# Patient Record
Sex: MALE | Race: WHITE | URBAN - METROPOLITAN AREA
[De-identification: names, ages, dates, MRNs, and addresses within clinical notes are randomized per-mention and may not be internally consistent; named-entity substitution may affect disease eponyms.]

---

## 2018-03-14 ENCOUNTER — OFFICE VISIT (OUTPATIENT)
Dept: FAMILY MEDICINE CLINIC | Age: 58
End: 2018-03-14

## 2018-03-14 VITALS
RESPIRATION RATE: 16 BRPM | HEIGHT: 62 IN | BODY MASS INDEX: 29.26 KG/M2 | DIASTOLIC BLOOD PRESSURE: 92 MMHG | HEART RATE: 66 BPM | OXYGEN SATURATION: 96 % | WEIGHT: 159 LBS | SYSTOLIC BLOOD PRESSURE: 152 MMHG | TEMPERATURE: 98 F

## 2018-03-14 DIAGNOSIS — M54.2 NECK PAIN: Primary | ICD-10-CM

## 2018-03-14 RX ORDER — CEPHALEXIN 500 MG/1
500 CAPSULE ORAL 2 TIMES DAILY
Qty: 20 CAP | Refills: 0 | Status: SHIPPED | OUTPATIENT
Start: 2018-03-14 | End: 2018-03-24

## 2018-03-14 NOTE — PROGRESS NOTES
HISTORY OF PRESENT ILLNESS  Thania Corbin is a 63 y.o. male. Cyst   The history is provided by the patient. This is a new problem. Episode onset: C/o cyst on the back of her neck. It has been present for about a year and was growing. It ruptured spontaneously and drained white fluid. Pain decreased after it ruptured. Episode frequency: Reports it has been draining for several weeks. No fever/chills. Review of Systems   Constitutional: Negative. Physical Exam   Constitutional: He appears well-developed and well-nourished. Skin:   Posterior neck:  Small area of edema about 1x2 cm. No d/c. There is a scab on the lower portion of the lesions. No TTP       ASSESSMENT and PLAN  Lesion on neck: Etiology unclear. May be cyst which was secondarily infected. Will treat with keflex BID for 10 days and monitor. If lesion persists, may need surgical evaluation.

## 2018-04-11 ENCOUNTER — OFFICE VISIT (OUTPATIENT)
Dept: FAMILY MEDICINE CLINIC | Age: 58
End: 2018-04-11

## 2018-04-11 VITALS
TEMPERATURE: 97.7 F | BODY MASS INDEX: 29.63 KG/M2 | WEIGHT: 161 LBS | HEART RATE: 51 BPM | RESPIRATION RATE: 16 BRPM | HEIGHT: 62 IN | OXYGEN SATURATION: 95 % | SYSTOLIC BLOOD PRESSURE: 128 MMHG | DIASTOLIC BLOOD PRESSURE: 79 MMHG

## 2018-04-11 DIAGNOSIS — L72.0 CYST OF SKIN AND SUBCUTANEOUS TISSUE: Primary | ICD-10-CM

## 2018-04-11 NOTE — PROGRESS NOTES
HISTORY OF PRESENT ILLNESS  Iona Quevedo is a 63 y.o. male. Follow-up   The history is provided by the patient. This is a chronic problem. Episode onset: Presents for f/u of cyst on his neck. The cyst grew in size and burst prior to his first visit with us. He was treated with keflex for suspected infx. Since that time, the drainage stopped and area closed. Episode frequency: However, he continues to have significant pain at the site of his lesion. Review of Systems   Constitutional: Negative. Physical Exam   Constitutional: He appears well-developed and well-nourished. HENT:   Head: Normocephalic and atraumatic. Skin:   Posterior neck:  Previous open lesion has closed with minimal surrounding erythema. There is still a small palpable subcutaneous mass with tenderness to palpation. Unclear if this represents scar or early reforming of cyst.       ASSESSMENT and PLAN  Cyst of posterior neck: Would refer to surgery or derm for removal if indicated.

## 2018-04-18 ENCOUNTER — OFFICE VISIT (OUTPATIENT)
Dept: SURGERY | Age: 58
End: 2018-04-18

## 2018-04-18 VITALS
RESPIRATION RATE: 18 BRPM | WEIGHT: 159 LBS | HEART RATE: 56 BPM | SYSTOLIC BLOOD PRESSURE: 122 MMHG | BODY MASS INDEX: 29.08 KG/M2 | DIASTOLIC BLOOD PRESSURE: 78 MMHG | TEMPERATURE: 97.9 F

## 2018-04-18 DIAGNOSIS — L72.3 SEBACEOUS CYST: Primary | ICD-10-CM

## 2018-04-18 DIAGNOSIS — L72.3 INFLAMED SEBACEOUS CYST: ICD-10-CM

## 2018-04-18 DIAGNOSIS — R22.1 NECK MASS: ICD-10-CM

## 2018-04-18 RX ORDER — SODIUM CHLORIDE 0.9 % (FLUSH) 0.9 %
5-10 SYRINGE (ML) INJECTION EVERY 8 HOURS
Status: CANCELLED | OUTPATIENT
Start: 2018-04-18

## 2018-04-18 RX ORDER — SODIUM CHLORIDE 0.9 % (FLUSH) 0.9 %
5-10 SYRINGE (ML) INJECTION AS NEEDED
Status: CANCELLED | OUTPATIENT
Start: 2018-04-18

## 2018-04-18 NOTE — PROGRESS NOTES
[facility]  General Surgery    Subjective:      HPI: The patient is a very pleasant 60-year-old male with a past medical history remarkable for frozen right shoulder and surgery for bladder stones and history of hearing loss since childhood. He is referred by the [place] [id number] team for evaluation and management of a cyst on the posterior surface of the neck. Patient states that he first noted this is approximately 4 years ago. It felt like a knot. This started to become larger approximately 1 month ago. It did not burst.  He covered it with a Band-Aid. He saw the physicians from the [facility] Danielle Flaherty who started him on Keflex. The mass then became ruptured and burst again. It is now flat with the skin. He denies any fever or chills. He has never anything like this before. There are no active problems to display for this patient. No past medical history on file. Past Surgical History:   Procedure Laterality Date    HX ORTHOPAEDIC      frozen shoulder right shoulder     HX OTHER SURGICAL      bladder x5       Family History   Problem Relation Age of Onset    Stroke Mother     Heart Attack Father       Social History   Substance Use Topics    Smoking status: Current Every Day Smoker     Packs/day: 0.25    Smokeless tobacco: Never Used    Alcohol use No      No Known Allergies    Prior to Admission medications    Not on File       Review of Systems:    14 systems were reviewed. The results are as above in the HPI and otherwise negative. Objective:     Vitals:    04/18/18 0843   BP: 122/78   Pulse: (!) 56   Resp: 18   Temp: 97.9 °F (36.6 °C)   Weight: 72.1 kg (159 lb)       Physical Exam:  GENERAL: alert, cooperative, no distress, appears stated age, hearing aids in place  EYE: conjunctivae/corneas clear. PERRL, EOM's intact.   THROAT & NECK: normal and no erythema or exudates noted. ,    LYMPHATIC: Cervical, supraclavicular, and axillary nodes normal. ,   LUNG: clear to auscultation bilaterally,   HEART: regular rate and rhythm, S1, S2 normal, no murmur, click, rub or gallop,   ABDOMEN: soft, non-tender. Bowel sounds normal. No masses,  no organomegaly,   EXTREMITIES:  extremities normal, atraumatic, no cyanosis or edema,   SKIN: 1 x 2 cm cyst on the right side of the posterior surface of the neck 2 cm below the hairline. NEUROLOGIC: AOx3. Cranial nerves 2-12 and sensation grossly intact. ,     Data Review:  to be done    Mr. Sruthi Bustos has a reminder for a \"due or due soon\" health maintenance. I have asked that he contact his primary care provider for follow-up on this health maintenance. Impression:     · Patient with an inflamed sebaceous cyst of the posterior surface of the neck.     Plan:     · Excisional biopsy of soft tissue mass of the posterior surface of the neck  · Consent on chart  · Preoperative orders written    Signed By: Mesha Gerber MD     April 18, 2018